# Patient Record
Sex: FEMALE | Race: WHITE | NOT HISPANIC OR LATINO | Employment: PART TIME | URBAN - METROPOLITAN AREA
[De-identification: names, ages, dates, MRNs, and addresses within clinical notes are randomized per-mention and may not be internally consistent; named-entity substitution may affect disease eponyms.]

---

## 2021-05-06 ENCOUNTER — TELEPHONE (OUTPATIENT)
Dept: UROLOGY | Facility: AMBULATORY SURGERY CENTER | Age: 41
End: 2021-05-06

## 2021-05-06 NOTE — TELEPHONE ENCOUNTER
Patient called in for an appointment - was originally scheduled for September in [facility]  Patients Dr Jose Haines called and asking if patient could be seen sooner as she is willing to travel  Scheduled patient in [facility] office on 5/21 with Lucia Talavera from office is faxing over patients records  Will keep an eye out  If records are not received we are to call Paige Talavera at [phone]

## 2021-05-19 RX ORDER — LEVOTHYROXINE SODIUM 0.05 MG/1
50 TABLET ORAL DAILY
COMMUNITY
Start: 2021-04-02

## 2021-05-21 ENCOUNTER — OFFICE VISIT (OUTPATIENT)
Dept: UROLOGY | Facility: MEDICAL CENTER | Age: 41
End: 2021-05-21
Payer: COMMERCIAL

## 2021-05-21 VITALS
DIASTOLIC BLOOD PRESSURE: 78 MMHG | WEIGHT: 164 LBS | HEIGHT: 64 IN | BODY MASS INDEX: 28 KG/M2 | SYSTOLIC BLOOD PRESSURE: 124 MMHG

## 2021-05-21 DIAGNOSIS — N39.0 RECURRENT UTI: Primary | ICD-10-CM

## 2021-05-21 LAB
SL AMB  POCT GLUCOSE, UA: NORMAL
SL AMB LEUKOCYTE ESTERASE,UA: NORMAL
SL AMB POCT BILIRUBIN,UA: NORMAL
SL AMB POCT BLOOD,UA: NORMAL
SL AMB POCT CLARITY,UA: CLEAR
SL AMB POCT COLOR,UA: YELLOW
SL AMB POCT KETONES,UA: NORMAL
SL AMB POCT NITRITE,UA: NORMAL
SL AMB POCT PH,UA: 6.5
SL AMB POCT SPECIFIC GRAVITY,UA: 1.02
SL AMB POCT URINE PROTEIN: NORMAL
SL AMB POCT UROBILINOGEN: 0.2

## 2021-05-21 PROCEDURE — 81003 URINALYSIS AUTO W/O SCOPE: CPT | Performed by: PHYSICIAN ASSISTANT

## 2021-05-21 PROCEDURE — 99204 OFFICE O/P NEW MOD 45 MIN: CPT | Performed by: PHYSICIAN ASSISTANT

## 2021-05-21 RX ORDER — SIMVASTATIN 10 MG
TABLET ORAL
COMMUNITY
Start: 2021-05-17

## 2021-05-21 NOTE — PROGRESS NOTES
5/21/2021      Chief Complaint   Patient presents with    Urinary Frequency         Assessment and Plan    41 y o  female new patient     1  Suspected interstitial cystitis   · Urine today: Negative  · Reviewed bladder irritants  · Increase daily water intake to 40 60 oz  · Encouraged to keep a voiding diary to identify triggers  · Ultrasound kidney and bladder ordered  · Schedule cystoscopy with MD for history of reported bladder polyps and recent urinary symptoms  · She does not have smoking history  · Follow up after cystoscopy to reassess symptoms  History of Present Illness  Lesli Yoon is a 41 y o  female here for evaluation of reported recurrent urinary tract infections  She was told the past that she had interstitial cystitis and her physician tried to start her on [place] but her insurance not cover this medication  She also reports that she received a cystoscopy over 10 years ago and was told that she had benign polyps in her bladder  She has not had any subsequent workup since  She does report intermittent daytime frequency but is unaware of any triggers that correlate with her flares  She states sometimes she can go up to 4 times within 90 minutes, other times she can go 3-4 hours without having to urinate  She currently denies any dysuria, urgency, fevers, chills, or gross hematuria  She states that about every 6 months for the past 2 years she has requested urine testing with suspected UTIs, but all but 1 of these urine cultures were negative  She does drink diet iced tea throughout the day with some water  Review of Systems   HENT: Negative  Eyes: Negative  Respiratory: Negative  Cardiovascular: Negative  Gastrointestinal: Positive for abdominal pain (suprapubic pain that radiates bilaterally to the back)  Endocrine: Negative  Genitourinary: Positive for frequency  Negative for difficulty urinating, dysuria, flank pain, hematuria and urgency  Musculoskeletal: Negative  Skin: Negative  Allergic/Immunologic: Negative  Neurological: Negative  Hematological: Negative  Psychiatric/Behavioral: Negative  Vitals  Vitals:    05/21/21 1335   BP: 124/78   Weight: 74 4 kg (164 lb)   Height: 5' 4" (1 626 m)       Physical Exam  Vitals signs reviewed  Constitutional:       General: She is not in acute distress  Appearance: Normal appearance  She is not ill-appearing, toxic-appearing or diaphoretic  HENT:      Head: Normocephalic and atraumatic  Eyes:      Conjunctiva/sclera: Conjunctivae normal    Neck:      Musculoskeletal: Normal range of motion  Pulmonary:      Effort: Pulmonary effort is normal  No respiratory distress  Abdominal:      General: There is no distension  Palpations: Abdomen is soft  Tenderness: There is no abdominal tenderness  There is no guarding or rebound  Musculoskeletal: Normal range of motion  Skin:     General: Skin is warm and dry  Neurological:      General: No focal deficit present  Mental Status: She is alert and oriented to person, place, and time  Psychiatric:         Mood and Affect: Mood normal          Behavior: Behavior normal          Thought Content:  Thought content normal          Judgment: Judgment normal        Past History  Past Medical History:   Diagnosis Date    Endometriosis     Interstitial cystitis      Social History     Socioeconomic History    Marital status: /Civil Union     Spouse name: None    Number of children: None    Years of education: None    Highest education level: None   Occupational History    None   Social Needs    Financial resource strain: None    Food insecurity     Worry: None     Inability: None    Transportation needs     Medical: None     Non-medical: None   Tobacco Use    Smoking status: Never Smoker    Smokeless tobacco: Never Used   Substance and Sexual Activity    Alcohol use: Never     Frequency: Never    Drug use: Never    Sexual activity: None   Lifestyle    Physical activity     Days per week: None     Minutes per session: None    Stress: None   Relationships    Social connections     Talks on phone: None     Gets together: None     Attends Shinto service: None     Active member of club or organization: None     Attends meetings of clubs or organizations: None     Relationship status: None    Intimate partner violence     Fear of current or ex partner: None     Emotionally abused: None     Physically abused: None     Forced sexual activity: None   Other Topics Concern    None   Social History Narrative    None     Social History     Tobacco Use   Smoking Status Never Smoker   Smokeless Tobacco Never Used     Family History   Problem Relation Age of Onset    Hypertension Paternal Grandmother     Hypertension Paternal Grandfather        The following portions of the patient's history were reviewed and updated as appropriate: allergies, current medications, past medical history, past social history, past surgical history and problem list     Results  No results found for this or any previous visit (from the past 1 hour(s))  ]  No results found for: PSA  No results found for: GLUCOSE, CALCIUM, NA, K, CO2, CL, BUN, CREATININE  No results found for: WBC, HGB, HCT, MCV, PLT    Josh Zaldivar PA-C

## 2021-05-24 ENCOUNTER — HOSPITAL ENCOUNTER (OUTPATIENT)
Dept: ULTRASOUND IMAGING | Facility: HOSPITAL | Age: 41
Discharge: HOME | End: 2021-05-24
Payer: COMMERCIAL

## 2021-05-24 ENCOUNTER — TELEPHONE (OUTPATIENT)
Dept: UROLOGY | Facility: MEDICAL CENTER | Age: 41
End: 2021-05-24

## 2021-05-24 DIAGNOSIS — N39.0 RECURRENT UTI: ICD-10-CM

## 2021-05-24 PROCEDURE — 76770 US EXAM ABDO BACK WALL COMP: CPT

## 2021-05-24 NOTE — TELEPHONE ENCOUNTER
Patient seen by [facility]    Patient called stating she had U/S done today and would like a call back once results are in      Patient can be reached at [phone] (M)

## 2021-05-26 NOTE — TELEPHONE ENCOUNTER
Call placed to radiology reading room and spoke with Whitney Newton  She will send an encounter over to the radiologists to have patients US read  Will check back for finalized results

## 2021-05-28 NOTE — TELEPHONE ENCOUNTER
Please call patient and let her know that her US was normal     She should keep appt for outpatient cysto       Thanks